# Patient Record
Sex: FEMALE | Race: WHITE
[De-identification: names, ages, dates, MRNs, and addresses within clinical notes are randomized per-mention and may not be internally consistent; named-entity substitution may affect disease eponyms.]

---

## 2022-02-15 VITALS
DIASTOLIC BLOOD PRESSURE: 58 MMHG | SYSTOLIC BLOOD PRESSURE: 118 MMHG | DIASTOLIC BLOOD PRESSURE: 58 MMHG | SYSTOLIC BLOOD PRESSURE: 118 MMHG

## 2022-02-15 NOTE — RAD
CT PELVIS W 



History: right low back pain and buttock pain  



Comparison: None.



Technique: After administration of intravenous contrast, helical CT of the pelvis was performed. Remi
nal and sagittal reconstructions were obtained. 75 mL of Isovue-370 were used. One or more of the fol
lowing dose reduction techniques were utilized: Automated exposure control (AEC), Adjustment of mA an
d/or kV according to patient size, Use of iterative reconstruction technique such as ASiR, CT scan do
ne according to ALARA and image gently/image wisely



Findings:

Hepatic steatosis. Cholecystectomy. Visualized kidneys demonstrate no mass or hydronephrosis. No dila
christiana large or small bowel. Colonic diverticulosis. Normal appendix.



There is no free fluid.



There is no mesenteric or retroperitoneal adenopathy. 



The abdominal aorta is normal in caliber. Mild aortoiliac atherosclerotic disease.



Urinary bladder is decompressed. Hysterectomy. There is no pelvic or inguinal adenopathy.  



Degenerative changes of the spine. Moderate neural foraminal narrowing at L4-5 and L5-S1 Postsurgical
 changes of posterior depression and instrumentation at L3-4.  



IMPRESSION:



1. No acute findings.

2. Colonic diverticulosis.

3. Degenerative lumbar spondylosis with moderate neural foraminal narrowing at L4-5 and L5-S1. No sev
ere spinal canal stenosis. Prior posterior decompression at L3-4.



Electronically signed by: Nate Torres MD (2/15/2022 4:43 AM) [facility]
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
## 2022-03-01 NOTE — KCIC
MRI lumbar spine without and with contrast



Contrast: 16 mL Clariscan gadolinium intravenous contrast.



COMPARISON: CT abdomen and pelvis April 19, 2016



HISTORY: Lumbar radiculopathy. Alternating lower back pain and bilateral leg pain.



FINDINGS: Slight lumbar scoliosis. Lumbar vertebral body height and alignment. No bone marrow edema. 
Postoperative changes L3 laminectomy, and separate left L5 laminotomy for presumed L5-S1 microdiscect
federico, and placement of pedicle screws and rods at L3-L4 since prior imaging. Within the L3 laminectomy
 and posteriorly within the midline dorsal paraspinal soft tissues there is a loculated fluid collect
ion within rim enhancement and surrounding edema this collection measures 18 mm craniocaudal by 13 mm
 AP by 21 mm transverse, there is no direct contact of the dural sac by the collection. Conus termina
amrit at the L1 vertebral level. Cauda equina is normal. There is no pathologic intradural contrast enh
ancement. There is diffuse degenerative disc desiccation throughout the lumbar spine. Disc disease as
 described further below.



L1-L2: Mild posterior disc height loss and mild disc bulge mildly deforms the ventral dural sac. Slig
ht narrowing of the right neural foramen. No significant neural foraminal stenosis. No spinal canal s
tenosis. Dural sac diameter 14 mm.



L2-L3: Mild posterior disc height loss and mild disc bulge deforms the ventral dural sac. Facet hyper
trophy. Slight narrowing of the right lateral recess. No significant neural foraminal stenosis. No sp
inal canal stenosis. Dural sac diameter of 13 mm.



L3-L4: There is mild enhancing scar tissue within the dorsal spinal canal at the laminectomy. Mild po
sterior disc height loss and mild posterior disc enhancement which may be scar tissue from prior micr
odiscectomy versus a mild enhancing disc annulus tear, there is no sizable disc bulge or herniation. 
No spinal canal stenosis. Dural sac diameter 14 mm. No neural foraminal stenosis.



L4-L5: Mild disc bulge eccentric to the right. Facet hypertrophy and mild spurring. There is mild to 
moderate neural foraminal stenosis. There may be very mild left neural foraminal stenosis. No spinal 
canal stenosis or neural sac diameter 13 mm.



L5-S1: Marked disc height loss present. From microdiscectomy, there is mild infiltrating nonmasslike 
soft tissue with mild contrast enhancement typical of scar tissue at the left L5 laminotomy and along
 the left dorsal, lateral and ventral epidural spinal canal mildly surrounding the left lateral dural
 sac and left S1 nerve root sleeve there is some exuberant scar tissue along the posterior disc annul
us abutting the left S1 nerve roots at the lateral recess. Some recurrent disc bulge is also possible
 as some of the prominent posterior and left lateral discal tissue at the ventral spinal canal and le
ft neural foramen has very limited contrast enhancement. Right neural foramen is patent. Left lateral
 disc ossified and facet spurring with moderate left neural foraminal stenosis.  No spinal canal sten
osis. Dural sac diameter 11 mm.



IMPRESSION:

1. Postoperative change of L3 laminectomy and instrumentation with bilateral L3-L4 pedicle screws. Th
ere is a loculated fluid collection at the laminectomy extending into the midline dorsal paraspinal t
issues which does not contact the dural sac or enter the spinal canal.



2. Postoperative change left L5 laminotomy for L5-S1 microdiscectomy. There is prominent scar tissue 
along the posterior and left lateral disc annulus and within the left aspect of the spinal canal surr
ounding the left lateral dural sac as well as surrounding the left S1 nerve root sleeve at the latera
l recess as described above.



3. Lumbar disc disease and facet arthritis associated with neural foraminal stenoses as described abo
ve. No spinal canal stenosis.



Electronically signed by: Bartolome Rutherford MD (3/1/2022 4:24 PM) [facility]

## 2022-03-09 NOTE — PDOC4
Procedure Note:


ICD 10 Code:


ICD 10 Code:


M54.16


M51.36


M 96.1





Procedure Note:


Patient was consented for lumbar epidural steroid injection with fluoroscopic 

guidance.  Risks were discussed including but not limited to: Bleeding, 

infection, possibility of epidural hematoma and subsequent neurological 

compromise, dural puncture, headaches, spinal cord and/or nerve damage, side 

effects of steroid medication, and poor results regarding pain control.  Patient

understands and wished to proceed.


Procedure is lumbar epidural steroid injection under local anesthetic using ster

ile prep and drape at the L4-5 level using C-arm fluoroscopic guidance in both 

AP and lateral views medications injected is 20 mg dexamethasone +10mL 

preservative-free normal saline and 2 mL contrast- condition at discharge is 

stable patient tolerated procedure well had no complications.











ERNESTO LEES MD                Mar 9, 2022 14:20

## 2022-03-09 NOTE — PDOC1
INITIAL PAIN CONSULT


DATE OF SERVICE:


DOS:


DATE: 3/9/22 


TIME: 14:13





CHIEF COMPLAINT:


Chief Complaint:


Low back and right lower extremity pain





HISTORY OF PRESENT ILLNESS:


56-year-old female presents history of pain low back and right lower extremity 

for about 1 month after she sneezed while at home had significant pain in the 

low back and the right leg posterior gluteus posterior lateral thigh and 

anterior thigh anteromedial thigh into the lower leg at times also into the foot

with a burning sensation and throbbing in the top of the right foot patient 

reports is worse with walking standing changing positions but also with 

prolonged sitting for greater than 15 to 20-minutes will exacerbate the pain as 

well.  Patient describes it as sharp and throbbing shooting tingling changes 

during the day worse with activity standing or walking radiating to right lower 

extremity aching and cramping and burning patient reports it wakes her from 

sleep about 4-5 times a night does not affect her bowel bladder control but does

affect her ability to walk significantly patient has had chiropractic treatment 

and is still ongoing also doing stretches and strengthening exercises from 

previous physical therapies in the past also is taking oxycodone hydrocodone and

Tylenol all of which help only a small amount patient reports she did have 

epidural injections prior to lumbar surgery about 7 years ago which were not 

helpful prior to the surgery.  Patient rates her disability rating 0-10 10 me 

the worst is a 7 with family home responsibilities and occupation self-care and 

life support activities 8 with recreation social activity and 10 with sexual 

behavior.  He did have a MRI scan of the lumbar spine showing laminotomy and 

laminectomy changes with instrumentation the bilateral L3-4 pedicle screws 

postoperative changes L5 laminotomy prominent scar tissue along the posterior 

and left lateral disc annulus at L5-S1 with mild enhancing scar tissue at L3-4 

with L4-5 mild disc eccentric to the right with mild to moderate neuroforaminal 

stenosis.  Patient reports no loss of motor function of the right lower 

extremity but significant fatigability with standing walking again even for 10 

to 15 minutes or so.  Patient reports no bowel or bladder incontinence.





PAST MEDICAL HISTORY:


PMH:


Dizziness, type 2 diabetes, hearing loss, kidney stone





PREVIOUS SURGERIES:


Past Surgical Hx:


Hysterectomy, cholecystectomy, lumbar laminectomy with fusion, septoplasty





CURRENT MEDICATIONS:


Current Meds:





Active Scripts








 Medications  Dose


 Route/Sig


 Max Daily Dose Days Date Category


 


 Hydrocodone-Apap


 7.5-325  **


  (Hydrocodone


 Bit/Acetaminophen)


 1 Tab Tablet  1 Tab


 PO PRN Q6HRS PRN


   3/9/22 Reported


 


 Tylenol


  (Acetaminophen)


 325 Mg Tablet  1-2 Tab


 PO QID


   3/9/22 Reported


 


 Aleve (Naproxen


 Sodium) 220 Mg


 Capsule  220 Mg


 PO BID


   3/9/22 Reported


 


 Meclizine Hcl 25


 Mg Tablet  1 Tab


 PO QID


   3/9/22 Reported


 


 Liver Complex


 Tablet (Milk


 Thistle/Nac/Dandel/Turmer)


 1 Each Tablet  2 Each


 PO DAILY


   3/9/22 Reported











ALLERGIES;


Allergies:  


Coded Allergies:  


     Statins-HMG-CoA Reductase Inhibitor (Verified  Allergy, Intermediate, 

2/15/22)


     nut - unspecified (Verified  Allergy, Intermediate, Hives, 3/9/22)





FAMILY HISTORY:


Family Hx:


Diabetes, heart disease, thyroid cancer





SOCIAL HISTORY:


Social Hx:


Patient drinks 1-2 alcoholic drinks a month smokes cigarettes approximately 1 

pack a day continues to smoke for the past 38 years, does not use any illegal 

illicit or recreational drugs, is  lives with her spouse and 2 children 

lives locally in [facility] currently is working from home for [employer].





REVIEW OF SYSTEMS:


ROS:


Positive for those items mentioned in history of present illness, all systems a

re reviewed, otherwise negative ,and are complete full and well-documented on 

patient's chart.





PHYSICAL EXAM:


VS:


Blood pressure is 112/68 pulse 79 respirations 18 temperature 90.5 F height 5 

feet 5 inches weight is 197 pounds.


PE:


PHYSICAL EXAMINATION:





GENERAL: The patient is awake, alert, oriented, appropriate, very pleasant in 

demeanor


HEENT: Shows normocephalic, atraumatic.  Extraocular movements are intact and 

symmetrical.  Oral cavity: Mucous membranes moist and pink.  Dentition is 

intact.


NECK: Shows anterior throat supple without palpable lymphadenopathy noted.  

Swallow reflex symmetrical.


CHEST: Shows normal on inspection.  Breath sounds are clear bilaterally, distant

but no rales or rhonchi auscultated.


HEART: Shows S1, S2 clear.  No murmurs auscultated.


ABDOMEN: Soft, nontender, nondistended.  No palpable organomegaly is noted.


BACK: Shows spine grossly in the midline.  Normal-appearing cervical lordotic 

curvature.  There is mildly increased thoracic kyphosis, some minor flattening 

of the lumbar lordotic curvature.  Lumbar paraspinous muscles show symmetrical 

on inspection, on palpation shows some moderate tenderness diffusely throughout 

the upper, middle and lower distribution of the paraspinous muscles bilaterally 

and also into the lower thoracic paraspinous musculature, firm and tender, but 

without specific trigger points, without radiation of pain.  The patient has 

good rotational motion of the lumbar spine, both laterally as well as extension 

and flexion without significant difficulty.  No tenderness over the spinous 

processes, sacrum or sacroiliac regions.


EXTREMITIES: Lower extremities show deep tendon reflexes 2+ in the patellar and 

tendo calcaneus tendons.  Motor exam is 4 on a scale of 5 with right 

dorsiflexion, extension, quadriceps and hamstring flexion and 5/5 on the left.  

Peripheral pulses are 1+ posterior tibial.  No peripheral edema is noted 

bilaterally.  Lower extremities are warm and dry to touch, equal in color and 

appearance.  Straight leg raise noted to be positive on the right about 45 

degrees, left side is negative.  Gaenslen's and Ketan's maneuvers are negative

bilaterally.  The patient is able to stand, stand on her toes without 

significant difficulty or loss of balance walks with a slight favoring gait 

favoring the right lower extremity not use any assistive devices such as canes 

or walkers to ambulate however.


SKIN: Shows warm and dry, good turgor.  No edema.  No sores, rashes or bruising 

throughout.





IMPRESSION:


Impression:


56-year-old female with approximate 1 month history of low back right lower 

extremity pain in a radicular fashion


MRI scan lumbar spine as noted


Type 2 diabetes


Hearing loss





Plan: Options were discussed with patient, including conservative medical 

management, physical therapies and interventional techniques.  Patient would 

like to pursue interventional techniques.  We discussed a lumbar epidural 

steroid injection using descriptions as well as anatomical models to describe 

the procedure.  Risks were discussed including but not limited to: Bleeding, 

infection, possibility of epidural hematoma and subsequent neurological 

compromise, dural puncture, headaches, spinal cord and/or nerve damage, side 

effects of steroid medication, and poor results regarding pain control.  Patient

understands and wished to proceed.  Patient will return to the clinic in 

approximately 2 weeks for follow-up, was counseled as to return appointment, 

activity level, and side effect to be aware of.





Procedure is lumbar epidural steroid injection under local anesthetic using 

sterile prep and drape at the L4-5 level using C-arm fluoroscopic guidance in 

both AP and lateral views medications injected is 20 mg dexamethasone +10mL 

preservative-free normal saline and 2 mL contrast- condition at discharge is 

stable patient tolerated procedure well had no complications.











ERNESTO LEES MD                Mar 9, 2022 14:19

## 2022-04-11 NOTE — PDOC
Progress Note - Pain Clinic


Date of Service:


DOS:


DATE: 4/11/22 


TIME: 08:37





Diagnosis:


Dx:


Lumbar radiculopathy with lumbar degenerative disease and lumbar postlaminectomy

syndrome





History or Present Illness:


HPI:


56-year-old female returns for follow-up status post lumbar epidural steroid 

action x1.  Patient reports about 90% improvement for the first 2 and half weeks

now down to about 50% improved but still slightly better than that as well 

patient reports pain in the low back and into the right lower extremity posterio

r gluteus lateral thigh anterior thigh medial thigh medial lower leg much 

improved after first injection patient reports he is sleeping better walking 

greater distances doing household activities work activities mostly working from

home as she is able to elevate her right leg which helps significantly and we 

will continue to encourage her to do so patient asks for a note for work r

egarding this and we will accommodate that as well on this visit is again.  

Patient reports pain is in the low back and right leg tingling and burning 

radiating aching sharp and shooting leg patient reports it returned gradually 

though after about 2 to 3 weeks very slowly but is not still back to baseline 

patient reports a 10 on scale 10 is worse over the past week 7-8 on average 5-6 

its least and is a 7 today.  Patient reports no bowel or bladder incontinence no

loss of motor function again patient was doing much better with increased 

activity standing walking changing positions and sleeping much better.  Patient 

reports no motor deficits,, but some fatigue feeling in the right lower 

extremity with ambulation.





Physical Exam:


VS:


Blood pressure is 120/61 pulse 73 respirations 16 temperature is 98.4 F, weight

is 1 9 3 pounds.


PE:


PHYSICAL EXAMINATION:





GENERAL: The patient is awake, alert, oriented, appropriate, very pleasant in 

demeanor


HEENT: Shows normocephalic, atraumatic.  Extraocular movements are intact and 

symmetrical.  Oral cavity: Mucous membranes moist and pink.  Dentition is 

intact.


NECK: Shows anterior throat supple without palpable lymphadenopathy noted.  

Swallow reflex symmetrical.


CHEST: Shows normal on inspection.  Breath sounds are clear bilaterally.


HEART: Shows S1, S2 clear.  No murmurs auscultated.


ABDOMEN: Soft, nontender, nondistended.  No palpable organomegaly is noted.


BACK: Shows spine grossly in the midline.  Normal-appearing cervical lordotic 

curvature.  There is slightly increased thoracic kyphosis, some minor flattening

of the lumbar lordotic curvature.  Lumbar paraspinous muscles show symmetrical 

on inspection, on palpation shows some moderate tenderness diffusely throughout 

the upper, middle and lower distribution of the paraspinous muscles but without 

specific trigger points, without radiation of pain.  The patient has good 

rotational motion of the lumbar spine, both laterally as well as extension and 

flexion without significant difficulty.  


EXTREMITIES: Lower extremities show deep tendon reflexes 2+ in the patellar and 

tendo calcaneus tendons.  Motor exam is 4 on a scale of 5 with right 

dorsiflexion, extension, quadriceps and hamstring flexion and 5/5 on the left.  

Peripheral pulses are 1+ posterior tibial.  No peripheral edema is noted 

bilaterally.  Lower extremities are warm and dry to touch, equal in color and 

appearance.


SKIN: Shows warm and dry, good turgor.  No edema.  No sores, rashes or bruising 

throughout.





Procedure:


Procedure:


Options discussed with patient.  Patient's old chart was reviewed as her current

medication regimen updated current review of systems updated today as well.  We 

will proceed with lumbar epidural steroid injection today with fluoroscopic 

guidance.  Risks were discussed including but not limited to: Bleeding, 

infection, possibility of epidural hematoma and subsequent neurological 

compromise, dural puncture, headaches, spinal cord and/or nerve damage, side 

effects of steroid medication, and poor results regarding pain control.  Patient

understands and wished to proceed.  Patient will return to the clinic in 

approximately 4 weeks for follow-up follow-up, was counseled as to return 

appointment, activity level, and side effects to be aware of.





Medication Injected:


Med Injected:


Procedure is lumbar epidural steroid injection under local anesthetic using 

sterile prep and drape at the L4-5 level using C-arm fluoroscopic guidance in 

both AP and lateral views medications injected is 20 mg dexamethasone +10mL 

preservative-free normal saline and 2 mL contrast- condition at discharge is 

stable patient tolerated procedure well had no complications.  Condition at 

discharge stable, paced tolerated the procedure well and had no complications.





Condition at Discharge:


Condition at Discharge:


Condition at discharge is stable, patient tolerated the procedure well and had 

no complications.











ERNESTO LEES MD               Apr 11, 2022 08:41

## 2022-05-11 ENCOUNTER — HOSPITAL ENCOUNTER (OUTPATIENT)
Dept: HOSPITAL 61 - PNCL | Age: 57
Discharge: HOME | End: 2022-05-11
Attending: ANESTHESIOLOGY
Payer: COMMERCIAL

## 2022-05-11 DIAGNOSIS — Z72.89: ICD-10-CM

## 2022-05-11 DIAGNOSIS — M51.16: Primary | ICD-10-CM

## 2022-05-11 DIAGNOSIS — M96.1: ICD-10-CM

## 2022-05-11 DIAGNOSIS — F17.210: ICD-10-CM

## 2022-05-11 DIAGNOSIS — Z79.899: ICD-10-CM

## 2022-05-11 PROCEDURE — 62323 NJX INTERLAMINAR LMBR/SAC: CPT

## 2022-05-11 NOTE — PDOC
Progress Note - Pain Clinic


Date of Service:


DOS:


DATE: 5/11/22 


TIME: 08:41





Diagnosis:


Dx:


Lumbar radiculopathy with lumbar degenerative disease and lumbar postlaminectomy

syndrome





History or Present Illness:


HPI:


56-year-old female returns for follow-up status post lumbar epidural steroid 

injection last seen April 11, 2022 patient did very well until she fell about 5 

days ago and fractured her left wrist.  Patient reports when she fell she had 

significant pain in the low back and now on the left side as opposed to the ri

ght side in the lower extremity posterior gluteus posterior thigh lateral thigh 

anterior thigh medial thigh into the posterior calf with some tingling in the 

foot as well some on the right side also but much worse on the left side since 

her fall patient reports her pain is a 10 on scale 10 is worse over the past 

week 7-8 on average 4 to [place] is a 7 today patient was aching and shooting 

tingling burning stabbing again worse on the left side since the fall.  Patient 

reports prior to that though she was doing very well with some of the pain 

returning only in the back but not into the lower extremities patient reports 

she is awaiting orthopedic consultation and has her left arm in a soft cast 

currently.  Patient reports pain that awaken her from sleep at night worse with 

walking standing change positions difficulty sleeping especially patient 

previously was doing much better with distance walking doing household 

activities work activities sleeping much better until the fall several days ago.

 Patient reports no bowel or bladder incontinence.





Physical Exam:


VS:


Blood pressure is 99/66 pulse 77 respirations 16 temperature 98.5 F weight is 

185 pounds


PE:


PHYSICAL EXAMINATION:





GENERAL: The patient is awake, alert, oriented, appropriate, very pleasant in 

demeanor


HEENT: Shows normocephalic, atraumatic.  Extraocular movements are intact and 

symmetrical.  Oral cavity: Mucous membranes moist and pink.  Dentition is 

intact.


NECK: Shows anterior throat supple without palpable lymphadenopathy noted.  

Swallow reflex symmetrical.


CHEST: Shows normal on inspection.  Breath sounds are clear bilaterally, no 

rales rhonchi wheezes auscultated.


HEART: Shows S1, S2 clear.  No murmurs auscultated.


ABDOMEN: Soft, nontender, nondistended.  No palpable organomegaly is noted. 


BACK: Shows spine grossly in the midline.  Normal-appearing cervical lordotic c

urvature.  There is slightly increased thoracic kyphosis, some flattening of the

lumbar lordotic curvature.  Lumbar paraspinous muscles show symmetrical on 

inspection, on palpation shows some moderate tenderness diffusely throughout the

upper, middle and lower distribution of the paraspinous muscles, but without 

specific trigger points, without radiation of pain.  The patient has good 

rotational motion of the lumbar spine, both laterally as well as extension and 

flexion without significant difficulty.  


EXTREMITIES: Lower extremities show deep tendon reflexes 2+ in the patellar and 

tendo calcaneus tendons.  Motor exam is 4 on a scale of 5 with right 

dorsiflexion, extension, quadriceps and hamstring flexion and 5/5 on the left.  

Peripheral pulses are 1+ posterior tibial.  No peripheral edema is noted 

bilaterally.  Lower extremities are warm and dry.


SKIN: Shows warm and dry, good turgor.  No edema.  No sores, rashes or bruising 

throughout.





Procedure:


Procedure:


Options discussed with patient.  Patient's chart was use her current medication 

regimen updated current review of systems updated today as well.  We will 

proceed with a lumbar epidural steroid injection today with fluoroscopic 

guidance.  Patient with new symptoms in the left lower extremity in a radicular 

pattern following L4-5 dermatomal distribution.  Risks were discussed including 

but not limited to: Bleeding, infection, possibility of epidural hematoma and 

subsequent neurological compromise, dural puncture, headaches, spinal cord 

and/or nerve damage, side effects of steroid medication, and poor results 

regarding pain control.  Patient understands and wished to proceed.  Patient 

will return to clinic in approximately 4 weeks for follow-up, was counseled as 

to return appointment, activity level, and side effects to be aware of.





Medication Injected:


Med Injected:


Procedure is lumbar epidural steroid injection under local anesthetic using 

sterile prep and drape at the L4-5 level using C-arm fluoroscopic guidance in 

both AP and lateral views medications injected is 20 mg dexamethasone +10mL 

preservative-free normal saline and 2 mL contrast- condition at discharge is 

stable patient tolerated procedure well had no complications.





Condition at Discharge:


Condition at Discharge:


Condition at discharge is stable, patient tolerated the procedure well and had 

no complications.











ERNESTO LEES MD               May 11, 2022 08:44

## 2022-05-11 NOTE — PDOC4
Procedure Note:


ICD 10 Code:


ICD 10 Code:


M54.16


M51.36


M96.1





Procedure Note:


Patient was consented for lumbar epidural steroid injection with fluoroscopic 

guidance.  Risks were discussed including but not limited to: Bleeding, 

infection, possibility of epidural hematoma and subsequent neurological 

compromise, dural puncture, headaches, spinal cord and/or nerve damage, side 

effects of steroid medication, and poor results regarding pain control.  Patient

understands and wished to proceed.


Procedure is lumbar epidural steroid injection under local anesthetic using 

sterile prep and drape at the L4-5 level using C-arm fluoroscopic guidance in 

both AP and lateral views medications injected is 20 mg dexamethasone +10mL 

preservative-free normal saline and 2 mL contrast- condition at discharge is 

stable patient tolerated procedure well had no complications.











ERNESTO LEES MD               May 11, 2022 08:45

## 2022-05-20 ENCOUNTER — HOSPITAL ENCOUNTER (OUTPATIENT)
Dept: HOSPITAL 61 - KCIC CT | Age: 57
End: 2022-05-20
Attending: ORTHOPAEDIC SURGERY
Payer: COMMERCIAL

## 2022-05-20 DIAGNOSIS — M79.632: Primary | ICD-10-CM

## 2022-05-20 PROCEDURE — 73200 CT UPPER EXTREMITY W/O DYE: CPT

## 2022-05-20 NOTE — [facility]
EXAMINATION: CT LEFT FOREARM WITHOUT IV CONTRAST



CLINICAL HISTORY: Left forearm pain following fall approximately 2 weeks ago. History of prior wrist 
fractures.



TECHNIQUE: Noncontrast serial axial images obtained through the left forearm with sagittal and corona
l reconstructions.



CT Dose Reduction Employed: One or more of the following individualized dose reduction techniques wer
e utilized for this examination:  1. Automated exposure control  2. Adjustment of the mA and/or kV ac
cording to patient size  3. Use of iterative reconstruction technique.



COMPARISON: Left forearm radiographs 5/11/2022





FINDINGS:  



No evidence of acute radius or ulna fracture. No definitive evidence of acute carpal bone fracture, h
owever, evaluation is limited by large field-of-view images with larger slice thickness. Joint alignm
ent maintained.



Remote healed comminuted intra-articular distal radius fracture with residual heterogeneous mineraliz
ation along several fracture planes. 3 small corticated ossicles/calcific densities adjacent to the u
lnar styloid, likely related to remote trauma including ununited ulnar styloid fracture and/or TFCC i
njury.



Muscles and tendons unremarkable on limited evaluation. No subcutaneous edema.





IMPRESSION:  



No evidence of acute radius or ulna fracture. If concerned for acute carpal bone fracture, dedicated 
wrist radiographs and/or CT wrist is recommended for further evaluation.



Remote healed comminuted intra-articular distal radius fracture.



Remote posttraumatic changes involving the distal ulna and/or TFCC as described.



Electronically signed by: Diogo Gross DO (5/20/2022 10:42 AM) DANY